# Patient Record
Sex: MALE | Race: OTHER | NOT HISPANIC OR LATINO | URBAN - METROPOLITAN AREA
[De-identification: names, ages, dates, MRNs, and addresses within clinical notes are randomized per-mention and may not be internally consistent; named-entity substitution may affect disease eponyms.]

---

## 2021-09-09 ENCOUNTER — EMERGENCY (EMERGENCY)
Facility: HOSPITAL | Age: 41
LOS: 0 days | Discharge: HOME | End: 2021-09-09
Attending: EMERGENCY MEDICINE | Admitting: EMERGENCY MEDICINE
Payer: MEDICAID

## 2021-09-09 VITALS
DIASTOLIC BLOOD PRESSURE: 84 MMHG | SYSTOLIC BLOOD PRESSURE: 124 MMHG | HEART RATE: 94 BPM | RESPIRATION RATE: 16 BRPM | OXYGEN SATURATION: 97 % | TEMPERATURE: 99 F | WEIGHT: 160.06 LBS

## 2021-09-09 DIAGNOSIS — K08.89 OTHER SPECIFIED DISORDERS OF TEETH AND SUPPORTING STRUCTURES: ICD-10-CM

## 2021-09-09 PROCEDURE — 99283 EMERGENCY DEPT VISIT LOW MDM: CPT

## 2021-09-09 RX ORDER — AMOXICILLIN 250 MG/5ML
1 SUSPENSION, RECONSTITUTED, ORAL (ML) ORAL
Qty: 21 | Refills: 0
Start: 2021-09-09 | End: 2021-09-15

## 2021-09-09 RX ORDER — IBUPROFEN 200 MG
600 TABLET ORAL ONCE
Refills: 0 | Status: COMPLETED | OUTPATIENT
Start: 2021-09-09 | End: 2021-09-09

## 2021-09-09 RX ORDER — IBUPROFEN 200 MG
1 TABLET ORAL
Qty: 40 | Refills: 0
Start: 2021-09-09 | End: 2021-09-18

## 2021-09-09 RX ORDER — AMOXICILLIN 250 MG/5ML
500 SUSPENSION, RECONSTITUTED, ORAL (ML) ORAL ONCE
Refills: 0 | Status: COMPLETED | OUTPATIENT
Start: 2021-09-09 | End: 2021-09-09

## 2021-09-09 RX ADMIN — Medication 600 MILLIGRAM(S): at 19:39

## 2021-09-09 RX ADMIN — Medication 500 MILLIGRAM(S): at 20:28

## 2021-09-09 NOTE — ED PROVIDER NOTE - NSFOLLOWUPCLINICS_GEN_ALL_ED_FT
[facility]  Dental  [address] [place] [id number]  Phone: [phone]  Fax:

## 2021-09-09 NOTE — ED PROVIDER NOTE - CLINICAL SUMMARY MEDICAL DECISION MAKING FREE TEXT BOX
42 yo male without any significant PMH c/o rt sided upper and lower toothache for about a week. No fever, chills, HA, neck pain, difficulties swallowing ot breathing, no additonal complaints,   Well-appearing well-nourished male in NAD, head AT/NC, PERRL, pink conjunctivae,  mmm, nml floor of the mouth, nml phonation without drooling or trismus,  poor dentition, no palpable abscess, ttp of posterior molars on the upper and lower rt, mild facial swelling without cellulitis, supple neck without midline spine ttp, nml work of breathing, awake and alert, no gross neuro deficits.    Abx prescribed, advised to follow up in dental clinic tomorrow morning.  Patient verbalized understanding and is amenable with the plan.

## 2021-09-09 NOTE — ED PROVIDER NOTE - PHYSICAL EXAMINATION
CONST: Well appearing in NAD  EYES: PERRL, EOMI, Sclera and conjunctiva clear.   ENT: mild right swelling, no cellulitis. ttp to right upper and lower molars. No nasal discharge. TM's clear B/L without drainage. Oropharynx normal appearing, no erythema or exudates. No abscess or swelling. Uvula midline. No temporal artery or mastoid tenderness.  NECK: Non-tender, no meningeal signs. normal ROM. supple   SKIN: Warm, dry, no acute rashes. Good turgor

## 2021-09-09 NOTE — ED PROVIDER NOTE - ATTENDING CONTRIBUTION TO CARE
40 yo male without any significant PMH c/o rt sided upper and lower toothache for about a week. No fever, chills, HA, neck pain, difficulties swallowing ot breathing, no additonal complaints,   Well-appearing well-nourished male in NAD, head AT/NC, PERRL, pink conjunctivae,  mmm, nml floor of the mouth, nml phonation without drooling or trismus,  poor dentition, no palpable abscess, ttp of posterior molars on the upper and lower rt, mild facial swelling without cellulitis, supple neck without midline spine ttp, nml work of breathing, awake and alert, no gross neuro deficits.    Abx prescribed, advised to follow up in dental clinic tomorrow morning.  Patient verbalized understanding and is amenable with the plan.

## 2021-09-09 NOTE — ED PROVIDER NOTE - OBJECTIVE STATEMENT
41 year old male with no pmhx presents with right sided dental pain x 1 day. No fever. 41 year old male with no pmhx presents with right sided dental pain x 1 week. No fever.

## 2021-09-09 NOTE — ED ADULT NURSE REASSESSMENT NOTE - NS ED NURSE REASSESS COMMENT FT1
Pt receive from previous RN . Pt c/o of dental pain. NAD at this time. Airway patent. No other complaints at this time.

## 2023-06-14 NOTE — ED PROVIDER NOTE - NS ED ROS FT
Review of Systems:  	•	CONSTITUTIONAL - no fever, no diaphoresis, no chills  	•	SKIN - no rash  	•	HEMATOLOGIC - no bleeding, no bruising  	•	EYES - no eye pain, no blurry vision  	•	ENT - no change in hearing, no sore throat, no ear pain or tinnitus  	•	RESPIRATORY - no shortness of breath, no cough Hemostasis: Aluminum Chloride and Electrocautery